# Patient Record
Sex: FEMALE | Race: ASIAN | NOT HISPANIC OR LATINO
[De-identification: names, ages, dates, MRNs, and addresses within clinical notes are randomized per-mention and may not be internally consistent; named-entity substitution may affect disease eponyms.]

---

## 2019-07-25 ENCOUNTER — RECORDS - HEALTHEAST (OUTPATIENT)
Dept: ADMINISTRATIVE | Facility: OTHER | Age: 19
End: 2019-07-25

## 2019-07-25 LAB
CHLAMYDIA_EXT- HISTORICAL: NEGATIVE
SPECIMEN DESCRIPTION_EXT (HISTORICAL CONVERSION): NORMAL

## 2019-08-01 ENCOUNTER — RECORDS - HEALTHEAST (OUTPATIENT)
Dept: ADMINISTRATIVE | Facility: OTHER | Age: 19
End: 2019-08-01

## 2019-09-05 ENCOUNTER — RECORDS - HEALTHEAST (OUTPATIENT)
Dept: ADMINISTRATIVE | Facility: OTHER | Age: 19
End: 2019-09-05

## 2019-10-03 ENCOUNTER — RECORDS - HEALTHEAST (OUTPATIENT)
Dept: ADMINISTRATIVE | Facility: OTHER | Age: 19
End: 2019-10-03

## 2019-10-03 ENCOUNTER — COMMUNICATION - HEALTHEAST (OUTPATIENT)
Dept: ADMINISTRATIVE | Facility: CLINIC | Age: 19
End: 2019-10-03

## 2019-10-25 ENCOUNTER — PRENATAL OFFICE VISIT - HEALTHEAST (OUTPATIENT)
Dept: MIDWIFE SERVICES | Facility: CLINIC | Age: 19
End: 2019-10-25

## 2019-10-25 DIAGNOSIS — B37.31 VAGINAL YEAST INFECTION: ICD-10-CM

## 2019-10-25 DIAGNOSIS — O23.599 BACTERIAL VAGINOSIS IN PREGNANCY: ICD-10-CM

## 2019-10-25 DIAGNOSIS — B96.89 BACTERIAL VAGINOSIS IN PREGNANCY: ICD-10-CM

## 2019-10-25 DIAGNOSIS — O13.3 GESTATIONAL HYPERTENSION, THIRD TRIMESTER: ICD-10-CM

## 2019-10-25 DIAGNOSIS — O26.843 UTERINE SIZE-DATE DISCREPANCY IN THIRD TRIMESTER: ICD-10-CM

## 2019-10-25 DIAGNOSIS — Z34.03 SUPERVISION OF NORMAL FIRST TEEN PREGNANCY IN THIRD TRIMESTER: ICD-10-CM

## 2019-10-25 DIAGNOSIS — O09.31: ICD-10-CM

## 2019-10-25 LAB
ALT SERPL W P-5'-P-CCNC: 11 U/L (ref 0–45)
APTT PPP: 26 SECONDS (ref 24–37)
AST SERPL W P-5'-P-CCNC: 18 U/L (ref 0–40)
CREAT SERPL-MCNC: 0.68 MG/DL (ref 0.6–1.1)
CREAT UR-MCNC: 115.6 MG/DL
ERYTHROCYTE [DISTWIDTH] IN BLOOD BY AUTOMATED COUNT: 14.1 % (ref 11–14.5)
GFR SERPL CREATININE-BSD FRML MDRD: >60 ML/MIN/1.73M2
HCT VFR BLD AUTO: 38.4 % (ref 35–47)
HGB BLD-MCNC: 12.9 G/DL (ref 12–16)
INR PPP: 0.9 (ref 0.9–1.1)
MCH RBC QN AUTO: 28.7 PG (ref 27–34)
MCHC RBC AUTO-ENTMCNC: 33.5 G/DL (ref 32–36)
MCV RBC AUTO: 85 FL (ref 80–100)
PLATELET # BLD AUTO: 200 THOU/UL (ref 140–440)
PMV BLD AUTO: 7.8 FL (ref 7–10)
PROTEIN, RANDOM URINE - HISTORICAL: 818 MG/DL
PROTEIN/CREAT RATIO, RANDOM UR: 7.08
RBC # BLD AUTO: 4.49 MILL/UL (ref 3.8–5.4)
URATE SERPL-MCNC: 7.6 MG/DL (ref 2–7.5)
WBC: 8.8 THOU/UL (ref 4–11)

## 2019-10-25 ASSESSMENT — MIFFLIN-ST. JEOR: SCORE: 1358.39

## 2019-10-25 ASSESSMENT — EDINBURGH POSTNATAL DEPRESSION SCALE (EPDS): TOTAL SCORE: 3

## 2019-10-26 ENCOUNTER — COMMUNICATION - HEALTHEAST (OUTPATIENT)
Dept: OBGYN | Facility: CLINIC | Age: 19
End: 2019-10-26

## 2019-10-26 ENCOUNTER — AMBULATORY - HEALTHEAST (OUTPATIENT)
Dept: OBGYN | Facility: CLINIC | Age: 19
End: 2019-10-26

## 2019-10-26 ENCOUNTER — HOSPITAL ENCOUNTER (OUTPATIENT)
Dept: MEDSURG UNIT | Facility: CLINIC | Age: 19
Discharge: HOME | End: 2019-10-26
Attending: ADVANCED PRACTICE MIDWIFE | Admitting: ADVANCED PRACTICE MIDWIFE

## 2019-10-26 DIAGNOSIS — B96.89 BACTERIAL VAGINOSIS IN PREGNANCY: ICD-10-CM

## 2019-10-26 DIAGNOSIS — O14.93 PRE-ECLAMPSIA IN THIRD TRIMESTER: ICD-10-CM

## 2019-10-26 DIAGNOSIS — B37.31 VAGINAL YEAST INFECTION: ICD-10-CM

## 2019-10-26 DIAGNOSIS — O13.3 GESTATIONAL HYPERTENSION, THIRD TRIMESTER: ICD-10-CM

## 2019-10-26 DIAGNOSIS — O09.31: ICD-10-CM

## 2019-10-26 DIAGNOSIS — O23.599 BACTERIAL VAGINOSIS IN PREGNANCY: ICD-10-CM

## 2019-10-26 LAB
CREAT UR-MCNC: 109.6 MG/DL
PROTEIN, RANDOM URINE - HISTORICAL: 684 MG/DL
PROTEIN/CREAT RATIO, RANDOM UR: 6.24

## 2019-10-27 ENCOUNTER — HOSPITAL ENCOUNTER (OUTPATIENT)
Dept: MEDSURG UNIT | Facility: CLINIC | Age: 19
Discharge: HOME | End: 2019-10-27
Attending: MIDWIFE | Admitting: ADVANCED PRACTICE MIDWIFE

## 2019-10-27 LAB
ALLERGIC TO PENICILLIN: NO
GP B STREP DNA SPEC QL NAA+PROBE: NEGATIVE

## 2019-10-27 ASSESSMENT — MIFFLIN-ST. JEOR: SCORE: 1362.36

## 2019-10-28 ENCOUNTER — PRENATAL OFFICE VISIT - HEALTHEAST (OUTPATIENT)
Dept: MIDWIFE SERVICES | Facility: CLINIC | Age: 19
End: 2019-10-28

## 2019-10-28 DIAGNOSIS — O14.93 PRE-ECLAMPSIA IN THIRD TRIMESTER: ICD-10-CM

## 2019-10-28 LAB
ALT SERPL W P-5'-P-CCNC: 27 U/L (ref 0–45)
APTT PPP: 24 SECONDS (ref 24–37)
AST SERPL W P-5'-P-CCNC: 30 U/L (ref 0–40)
CREAT SERPL-MCNC: 0.71 MG/DL (ref 0.6–1.1)
ERYTHROCYTE [DISTWIDTH] IN BLOOD BY AUTOMATED COUNT: 14.5 % (ref 11–14.5)
GFR SERPL CREATININE-BSD FRML MDRD: >60 ML/MIN/1.73M2
HCT VFR BLD AUTO: 38.9 % (ref 35–47)
HGB BLD-MCNC: 13.1 G/DL (ref 12–16)
INR PPP: 0.91 (ref 0.9–1.1)
MCH RBC QN AUTO: 29.1 PG (ref 27–34)
MCHC RBC AUTO-ENTMCNC: 33.8 G/DL (ref 32–36)
MCV RBC AUTO: 86 FL (ref 80–100)
PLATELET # BLD AUTO: 278 THOU/UL (ref 140–440)
PMV BLD AUTO: 7.8 FL (ref 7–10)
RBC # BLD AUTO: 4.52 MILL/UL (ref 3.8–5.4)
URATE SERPL-MCNC: 7.8 MG/DL (ref 2–7.5)
WBC: 15.4 THOU/UL (ref 4–11)

## 2019-10-29 ENCOUNTER — HOSPITAL ENCOUNTER (OUTPATIENT)
Dept: ULTRASOUND IMAGING | Facility: CLINIC | Age: 19
Discharge: HOME | End: 2019-10-29
Attending: ADVANCED PRACTICE MIDWIFE

## 2019-10-29 DIAGNOSIS — O14.93 PRE-ECLAMPSIA IN THIRD TRIMESTER: ICD-10-CM

## 2019-10-30 ENCOUNTER — COMMUNICATION - HEALTHEAST (OUTPATIENT)
Dept: ADMINISTRATIVE | Facility: CLINIC | Age: 19
End: 2019-10-30

## 2019-10-30 ENCOUNTER — AMBULATORY - HEALTHEAST (OUTPATIENT)
Dept: MIDWIFE SERVICES | Facility: CLINIC | Age: 19
End: 2019-10-30

## 2019-10-30 ENCOUNTER — COMMUNICATION - HEALTHEAST (OUTPATIENT)
Dept: OBGYN | Facility: CLINIC | Age: 19
End: 2019-10-30

## 2019-10-30 DIAGNOSIS — O09.31: ICD-10-CM

## 2019-10-30 DIAGNOSIS — B37.31 VAGINAL YEAST INFECTION: ICD-10-CM

## 2019-10-30 DIAGNOSIS — O23.599 BACTERIAL VAGINOSIS IN PREGNANCY: ICD-10-CM

## 2019-10-30 DIAGNOSIS — B96.89 BACTERIAL VAGINOSIS IN PREGNANCY: ICD-10-CM

## 2019-10-30 DIAGNOSIS — O13.3 GESTATIONAL HYPERTENSION, THIRD TRIMESTER: ICD-10-CM

## 2019-11-02 ENCOUNTER — HOME CARE/HOSPICE - HEALTHEAST (OUTPATIENT)
Dept: HOME HEALTH SERVICES | Facility: HOME HEALTH | Age: 19
End: 2019-11-02

## 2019-11-05 ENCOUNTER — HOME CARE/HOSPICE - HEALTHEAST (OUTPATIENT)
Dept: HOME HEALTH SERVICES | Facility: HOME HEALTH | Age: 19
End: 2019-11-05

## 2019-11-05 ENCOUNTER — COMMUNICATION - HEALTHEAST (OUTPATIENT)
Dept: HOME HEALTH SERVICES | Facility: HOME HEALTH | Age: 19
End: 2019-11-05

## 2019-11-11 ENCOUNTER — COMMUNICATION - HEALTHEAST (OUTPATIENT)
Dept: MIDWIFE SERVICES | Facility: CLINIC | Age: 19
End: 2019-11-11

## 2019-11-11 ENCOUNTER — RECORDS - HEALTHEAST (OUTPATIENT)
Dept: HEALTH INFORMATION MANAGEMENT | Facility: CLINIC | Age: 19
End: 2019-11-11

## 2019-12-19 ENCOUNTER — OFFICE VISIT - HEALTHEAST (OUTPATIENT)
Dept: MIDWIFE SERVICES | Facility: CLINIC | Age: 19
End: 2019-12-19

## 2019-12-19 DIAGNOSIS — Z87.59 HISTORY OF IUFD: ICD-10-CM

## 2019-12-19 DIAGNOSIS — B37.31 VAGINAL YEAST INFECTION: ICD-10-CM

## 2019-12-19 DIAGNOSIS — N89.8 VAGINAL DISCHARGE: ICD-10-CM

## 2019-12-19 DIAGNOSIS — Z87.59 HISTORY OF PRIOR PREGNANCY WITH IUGR NEWBORN: ICD-10-CM

## 2019-12-19 DIAGNOSIS — Z87.59 HISTORY OF SEVERE PRE-ECLAMPSIA: ICD-10-CM

## 2019-12-19 DIAGNOSIS — B96.89 BV (BACTERIAL VAGINOSIS): ICD-10-CM

## 2019-12-19 DIAGNOSIS — R21 SKIN RASH: ICD-10-CM

## 2019-12-19 DIAGNOSIS — N76.0 BV (BACTERIAL VAGINOSIS): ICD-10-CM

## 2019-12-19 DIAGNOSIS — Z87.59 HISTORY OF PLACENTAL ABRUPTION: ICD-10-CM

## 2019-12-19 LAB
CLUE CELLS: ABNORMAL
TRICHOMONAS, WET PREP: ABNORMAL
YEAST, WET PREP: ABNORMAL

## 2019-12-19 ASSESSMENT — MIFFLIN-ST. JEOR: SCORE: 1266.42

## 2019-12-19 ASSESSMENT — EDINBURGH POSTNATAL DEPRESSION SCALE (EPDS): TOTAL SCORE: 7

## 2020-03-30 ENCOUNTER — COMMUNICATION - HEALTHEAST (OUTPATIENT)
Dept: FAMILY MEDICINE | Facility: CLINIC | Age: 20
End: 2020-03-30

## 2020-04-02 ENCOUNTER — AMBULATORY - HEALTHEAST (OUTPATIENT)
Dept: MIDWIFE SERVICES | Facility: CLINIC | Age: 20
End: 2020-04-02

## 2020-04-02 DIAGNOSIS — Z00.00 ROUTINE ADULT HEALTH MAINTENANCE: ICD-10-CM

## 2020-04-07 ENCOUNTER — COMMUNICATION - HEALTHEAST (OUTPATIENT)
Dept: FAMILY MEDICINE | Facility: CLINIC | Age: 20
End: 2020-04-07

## 2020-04-07 ENCOUNTER — COMMUNICATION - HEALTHEAST (OUTPATIENT)
Dept: PEDIATRICS | Facility: CLINIC | Age: 20
End: 2020-04-07

## 2020-04-07 DIAGNOSIS — Z11.1 SCREENING EXAMINATION FOR PULMONARY TUBERCULOSIS: ICD-10-CM

## 2020-04-08 ENCOUNTER — AMBULATORY - HEALTHEAST (OUTPATIENT)
Dept: OBGYN | Facility: CLINIC | Age: 20
End: 2020-04-08

## 2020-04-08 DIAGNOSIS — Z00.00 HEALTHCARE MAINTENANCE: ICD-10-CM

## 2021-06-02 NOTE — PROGRESS NOTES
Outpatient/Triage Note:    Patient Name:  Florentino Brantley  :      2000  MRN:      [id number]    Assessment:   @ 34w5d   Preeclampsia based on BP readings and proteinuria  S<D  Teen pregnancy    Plan:   -NST  -Follow up growth with BPP now  -P/C ratio now  -Serial BPs  -GBS  -Consult with [facility] after results are back      Subjective  Florentino Brantley is a 18 y.o.  who presented to [facility] for evaluation of newly elevated P/C ratio with previous dx of GHTN at 34 5/7 weeks.  She is late to prenatal care, beginning at 21 5/7 weeks with [facility] (19).  She states she did have a free ultrasound at a location in [facility] in July that gave JOSETTE of 19.  Approximate LMP of 3/16/19 gave JOSETTE of 19.  FAS on 19 gave JOSETTE of 19, which is inconsistent with LMP dating but consistent with pt reported US in July.      US results from 19:  Normal FAS, EFW 14%ile, anterior placenta, cvx 4.5    She had regular care and screening after her initial visit until 10/3/19.  At her prenatal visit on 10/3/19 at [facility], she was noted to have elevated BP at 134/93.  She was also noted to be S<D and follow up growth US was ordered.  She did not complete this, due to desire to change to [facility] for preferred delivery at .  Labs were completed on 10/3/19 and were WNL with exception of uric acid at 6.8.    She had her initial OB appt with [facility] 10/25/19 and was noted to have elevated BP again at 142/92.  She was given the diagnosis of GHTN and labs were completed.  They were normal with exception of uric acid at 7.6 and P/C ratio of 7.08.  This lab was noted late last evening (150).  Pt was called this AM to come to [facility] for evaluation of maternal/fetal status and OB consult.      Florentino and I spoke about the range of hypertensive disorders in pregnancy and how they can affect mother and fetus.  Based on her elevated BP with 2 readings > 4 hours apart, normal liver enzymes, Hgb and Plt, but  "now proteinuria, her diagnosis would be preeclampsia without severe features.  We discussed physiology of hypertension and issues with fetal growth and worsening progression as pregnancy continues in some cases.  Our objective is to continue pregnancy until 37 0/7 weeks as long as maternal and fetal status remain stable.  We discussed follow up growth US, repeat P/C ratio, and OB consult today.  We discussed likely/possible recommendation of the following:    GBS  24 hour urine  Betamethasone initiation  Twice weekly office visits, BPPs with doppler, and labs  Delivery at 37 0/7 weeks or sooner with worsening disease    Objective  Temp:  [98.4  F (36.9  C)] 98.4  F (36.9  C)  Heart Rate:  [96] 96  Resp:  [16] 16  BP: (119)/(81) 119/81    Physical Exam:  General appearance:  comfortable  Psych: AAO x3  Skin: Pink, warm & dry  HEENT: unremarkable  Cardiovascular:  Current BP and pulse WNL  Respiratory:  Breathing easily on RA  Abdomen: soft, NT, gravid    FHR:Baseline: 150 bpm, Variability: Moderate (6 - 25 bpm), Accelerations: \"present and Decelerations: Absent    Uterine contractions:TocoFrequency: irregular, Duration: 30-60 seconds and Intensity: mild.  Pt not feeling them.    After hydration uterine activity is rare    SVE: not examined    Extremeties: without significant edema    Total time spent with patient:15 minutes, >50% spent on counseling and coordination of care.    Provider:  Katherine Raygoza CNM, RITO,SIVAN    Date:  10/26/2019  Time:  1:07 PM              "

## 2021-06-02 NOTE — PROGRESS NOTES
Pt of CNM presents to [facility] for second betamethasone shot due to elevated blood pressures at 34 6/7 weeks gestation.  Pt is G2/0010, EDC 12/2/19.  EFM on.  Hx and info obtained.  Pt denies HA, epigastric pain, swelling, vision changes.  Reflexes +2 bilat, no clonus bilat.

## 2021-06-02 NOTE — TELEPHONE ENCOUNTER
----- Message -----  From: Aliyah Lees APRN, CNM  Sent: 10/25/2019  10:52 PM CDT  To: RITO West CNM, #    Transfer of care visit today with elevated blood pressure.  Urine PC ratio and uric acid elevated, results reviewed now.  Urine PC ratio up from 10/3/2019 measuring 0.24.  Plan to [place] [facility] [place] in the morning (10/26/2019) including consultation with in-house obstetrician (simply considering time of day, 10:49 PM).

## 2021-06-02 NOTE — PROGRESS NOTES
Shelbi Robles CNM updated on betamethasone shot, BP's, no s/s preeclampsia, FHT's.  Orders received to D/C pt.  Pt to follow up in clinic later this week.  D/C instructions discussed with pt.  Pt understands and is agreeable with plan.  D/C to home undelivered.

## 2021-06-02 NOTE — PROGRESS NOTES
"Subjective:  Florentino presents to clinic alone at 35w0d for a check for preeclampsia. She reports she has been feeling well. Highly aware of preeclampsia symptoms; denies headache / vision changes / RUQ pain. Baby is active. Denies bleeding / LOF / contractions. BV and yeast symptoms resolved. Feeling \"a little worried\" about IOL, wondering if the baby needs more time to grow, wondering if the preeclampsia could \"get better.\" Reviewed recommendation for IOL at 37 week for best maternal & fetal outomes, no chance the preeclampsia will \"resolve\" and she will be pregnant longer than this. Reassurance provided. Reviewed GBS negative. Reports good support from her fiance (Pérez), mother, and mother-in-law. Baby shower this weekend-- will purchase carseat after if not given as a gift. Hoping for unmedicated labor, but open to epidural if needed. Undecided on peds provider-- will work on that this week. Working on preregistration tonight.    Objective:  BP (!) 142/96 (Patient Site: Right Arm, Patient Position: Sitting, Cuff Size: Adult Regular)   Pulse 81   Wt 152 lb (68.9 kg)   LMP 03/16/2019 (Approximate)   SpO2 98%   BMI 29.69 kg/m      Blood pressure recheck: 146/88    General: Alert, well-appearing 18-yo female  Extremities: 1+ edema, 2+ DTRs, negative for clonus    Assessment:  -Preeclampsia w/o severe features  -Elevated blood pressure, non-severe range  -Reactive NST today  -IUGR  -High-risk pregnancy in third trimester  -GBS negative    Plan:  -Repeat labs today-- will not reorder protein / creatine ratio as changes in proteinuria will not influence management decisions. Florentino is aware she will receive a phone call if labs indicate she needs to return to the hospital tonight for further workup or treatment.  -BPP tomorrow  -Continue twice weekly visits with CNMs, BPPs, and lab recheck  -IOL at 37w, sooner if severe features develop    "

## 2021-06-02 NOTE — TELEPHONE ENCOUNTER
Telephone Call with on Call SIVAN Good returned the voicemail that was left for her this am.  Reviewed elevated PCR and what this result may indicate.  Recommend that she present to [facility] for evaluation which is likely to include BP, ultrasound, labs.  An OB consult will also occur to discuss current status and the plan moving forward. She verbalized understanding.    Unfortunately, her hospital of choice, [facility] is on divert and cannot accept more patients.  However, [facility] is open and RITO López CNM is on site.  She agrees to present to [facility] between 12:00 and 1:00.  Katherine Raygoza is aware.    RITO West CNM

## 2021-06-02 NOTE — PATIENT INSTRUCTIONS - HE
[facility] Nurse Midwives - Contact information:  Appointment line and to get a hold of CNM in clinic Monday-Friday 8 am - 5 pm:  [phone].  There are some clinics with early start times (1st appointment 7:40 am) and others with evening hours (last appointment 6:20 pm).  Most are typically open from 8 am to 5 pm.    CNM on call answering service: [phone].  Specify your hospital of choice and leave a brief message for CNM;  will then page CNM who is on call at your specified hospital and you should receive a call back with 15 minutes.  Be sure that your ringer is audible and that you can accept blocked calls so that we can get back in touch with you! This number should be reserved for urgent needs if during the day, before 8 am, after 5 pm, weekends, holidays.    Contact the on-call CNM with warning signs, such as:    vaginal bleeding     Vaginal discharge and itching or pain and burning during urination    Leg/calf pain or swelling on one side    severe abdominal pain    nausea and vomiting more than 4-5 times a day, or if you are unable to keep anything down    fever more than 100.4 degrees F.          You are invited to  Meet the [facility] Nurse-Midwives  A way to tour the hospital Labor and Delivery unit and meet the midwives that attend births since you may not have the opportunity to meet them during your prenatal care.  Some sessions are informal meet and greet type social hours, others address a specific concern or topic.    Tuesday, Februrary 12, 2019 7-8pm  [facility]    Wednesday, April 17, 2019 7-8pm  [facility], [facility]    Thursday, August 15, 2019 7-8pm  [facility]    Wednesday November 13, 2019 7-8 pm  [facility], [facility]    Please call [phone] to register        Touring the Maternity Care Center  To schedule a tour at either [place] or [facility], please do so online using  the following links:  [facility] - [email or web address]  [facility] - [email or web address]      Car Seat Clinics:  https://dps.[place].gov/divisions/ots/child-passenger-safety/Pages/car-seat-checks.aspx  [facility]    Free Car Seat Distribution Facilities     By Appt. Address Contact Information (For appointment)      \Yes [employer]\[address]\[place],\cell Stephanie Garcia)-\[email or web address]      Yes [facility]\ [facility]\[place],\[address])[email or web address]      Yes [facility]\[address] [facility]\[place],\cell Mikayla Elias)[email or web address]      Immunizations:  http://www.cdc.gov/vaccines/schedules/easy-to-read/child.html      Postpartum Depression  The first weeks of caring for a  baby are more than a full-time job. Although it is often a happy time, your feelings and moods may not be what you expected. Many women experience  baby blues.  Here are some tips to help you understand when feelings of sadness are normal, and when you should call your health care provider.    What are the baby blues?  As many as 3 of every 4 women will have short periods of mood swings, crying, or feeling cranky or restless during the first weeks after birth. These feelings can be worse when you are tired or anxious. Women who have the baby blues often say they feel like crying but don t know why. Baby blues usually happen in the first or second week postpartum (after you give birth) and last less than a week. If you are not sleeping, becoming more upset, don t feel like you can take care of your baby, or your sadness lasts 2 weeks or more, call your health care provider.    What is postpartum  depression?  About one in every 5 women will develop depression during the first few months postpartum that may be mild, moderate, or severe. Women who have postpartum depression may have some of these symptoms:    Feeling guilty     Not able to enjoy your baby and feeling like you are not bonding with your baby      Not able to sleep, even when the baby is sleeping    Sleeping too much and feeling too tired to get out of bed    Feeling overwhelmed and not able to do what you need to during the day    Not able to concentrate    Don t feel like eating    Feeling like you are not normal or not yourself anymore    Not able to make decisions    Feeling like a failure as a mother    Feeling lonely or all alone    Thinking your baby might be better off without you  If you have any of these symptoms, call your health care provider!    Which symptoms of postpartum depression are dangerous?  Sometimes a woman with postpartum depression will have thoughts of harming herself or her baby. If you find yourself thinking about hurting yourself or your baby, call your health care provider immediately.    MOTHERHOOD: THE EARLY DAYS  You prepare for the birth of your baby for many months during pregnancy, and then the first months at home after your baby is born can be a quiet, gentle time of getting to know this new person who has come to live in your home. But for most women it is not all quiet or sweet. And for some women it is a very hard time.  What Can I Expect in the First Few Months After the Baby Comes?  New mothers and their families face many challenges in the first few months:    Your body and your hormones have to get back to normal.    You and the baby will be learning to breastfeed.    Babies only sleep a few hours at a time. The entire family will have a hard time getting enough sleep.    You and your family need to learn how to parent this new family member.    If you have a partner, you have to figure out how to  stay together as a couple and maybe even start to have sex again.    You may have to figure out how to keep from getting pregnant again right away.    You may need to return to work and find day care.    How Long Will it Take for My Body to Get Back to Normal?  Some changes will occur quickly. Others will not occur as quickly.    Your uterus, cervix, and vagina will all shrink to their nonpregnant size in about 2 weeks. Your vagina may be tender and dry for a few months--especially if you are breastfeeding.    If you had stitches or hemorrhoids, your   bottom   will be sore for 2 weeks or more.    For some women who have problems urinating, it can take several months for you to be able to hold your urine when you cough or sneeze or suddenly  something heavy.    Your breast milk will   come in   2 to 3 days after the birth of your baby. It will take 6 to 8 weeks for you and the baby to get the hang of breastfeeding and find a pattern. During these first weeks, you can have engorged breasts at times and often leak milk.    Your stomach and intestines all have to fall back into place. You may have a lot of gas for a few weeks.    You may be constipated--especially if you are breastfeeding.    Your stretched stomach muscles can recover in a few weeks, but for some women it takes longer--6 months or a year--to recover.    If you had a  delivery, you may have pain or numbness around the incision for 6 months or more.    Losing the weight you gained during pregnancy will probably take 6 months to a year. Have patience! It took 40 weeks to get here. Give yourself 40 weeks to get back.    What Can I Expect When My Hormones Change?  About 75% of all women will get the   blues.   This usually starts about 3 days after the birth of your baby. You may cry easily and feel very, very tired. A few women become very depressed. If you had a  delivery or your new baby was sick, you are at a higher risk for  depression.  Call your health care provider right away if you cannot care for yourself or your baby, if you feel very nervous or worried, if you cannot stop crying, or if you are having thoughts of hurting yourself or your baby.    Taking Care of Yourself  While you are still pregnant:    Talk with your partner and your family about the time ahead. Arrange for someone to help you during the first weeks at home if you can.    Talk with your health care provider about birth control options and make a plan before the baby comes.    If you are worried about how to parent a , take parenting classes. You will learn a lot about how babies act and you will make some friends who are going through the same thing at the same time. Most [facility] have these classes.    Arrange for someone to help with baby care if you can.  After the baby comes:    Ask for help. Let other people do the cooking and cleaning and run the house. Focus on yourself and your baby.    Sleep whenever you can. Try not to be tempted to   get some things done   when the baby sleeps. This is your time to sleep, too.    Drink lots of water. You will need at least 6 big glasses of water everyday to avoid constipation and make enough breast milk. Every time you sit down to breastfeed, have a big glass of water with you to drink while you are nursing.    Eat lots of vegetables and fruit. You will need lots of vitamins and fiber to help your body get back to normal. This will also help you avoid constipation.    Go outside and walk. Babies can go outside even if it is very cold. Fresh air and sunshine will do you both good.    Take sitz baths. Put about 6 inches of warm water in your bathtub and sit in there for 15 minutes 2 to 3 times a day. This will help your   bottom   heal more quickly. It will also give you 15 minutes of private time!    Talk to other mothers. Join a new parents group. Call Maximino and go to [facility] meetings if you are  breastfeeding.     With your partner:    Keep talking. Share the experience.    Spend time alone. Even a 30-minute walk can be a date.    Start a birth control method. You can get pregnant before you even have a period. It is very important to use birth control if you do not want to get pregnant again right away.    When you have sex, use a lubricant. A lot of lubricant! Take it slow.  The first few months after a baby comes can be a lot like floating in a jar of honey--very sweet and acosta, but very sticky, too. Take time to enjoy the good parts. Remind yourself that this time will pass. Bon voyage!    FOR MORE INFORMATION  For questions about depression during and after pregnancy:  http://www.womenshealth.gov/publications/our-publications/fact-sheet/depression-pregnancy.html   After birth: The first 6 weeks:  http://www.[employer]/Post-Birth-and-Recovery   Breastfeeding resources:  http://www.[employer].org/health-info/getting-breastfeeding-off-to-a-good-start/    HEALTHY PREGNANCY CARE: 37 to 41 WEEKS PREGNANT    Talk with your midwife or physician about when to call with signs of labor    Regular uterine contractions that are getting closer together and/or stronger    If you think your water has broken or is leaking    Bleeding from the vagina like a period (bloody vaginal discharge is normal)    If you are not feeling your baby move    Make plans for transportation and  as needed for when you are going to the hospital.    Your midwife or physician may offer to check your cervix for changes.     Ask your health care provider about vaccinations you may need following delivery. By now, you should have received a Tdap immunization to protect against pertussis or whooping cough. Fathers and family members who will be in close contact with the baby should also receive a Tdap shot at least two weeks before the expected birth of the baby if they have not had a Td (tetanus) shot for at least  two years.    If you are past your due date, discuss the next steps leading to delivery with your midwife or physician. If you don't start labor on your own by 41 or 42 weeks, your midwife or physician may recommend giving you medicines to ripen your cervix and start labor.    Preparing for your baby: Tell your midwife or physician how you plan to feed your baby (breast or bottle), who you have chosen to do pediatric care for your baby, and if you have a boy, whether you have chosen to have him circumcised. You will need a car seat correctly installed in your vehicle to bring your baby home. As you start to set up the nursery at home for your baby, make sure the crib is safe. The mattress needs to fit snugly against the edges of the crib. If you can fit a soda can between the bars, they are too far apart and can allow the baby's head to caught between them.    Learn about infant care and feeding, including information about infant CPR. We recommend that you put your baby to sleep on his or her back to reduce the chance of Sudden Infant Death Syndrome (SIDS). To maintain a healthy environment in which your child can grow, it's best to keep your home smoke-free. By preparing ahead, your transition into parenthood will go smoothly for you and your baby.    Your midwife or physician will want to see you for a checkup 2 to 6 weeks after delivery.    If you have questions about any symptoms you are experiencing or any other concerns, call your provider or their clinic staff at [facility] at Phone: [phone]. If it's after clinic hours, physician patients should call the [facility] at [phone] ([phone]); midwife patients should call their answering service at [phone].    How can you care for yourself at home?   You can refer to the Starting Out Right book or find it online at http://www.[facility].org/images/stories/maternity/[facility]-Starting-Out-Right.pdf or  http://www.[facility].org/images/stories/flipbooks/[facility]-starting-out-right/[facility]-starting-out-right.html#p=8     You can sign up for a weekly parenting e-mail that gives support, tips and advice from health care professionals that starts with pregnancy and continues through the toddler years. To register, go to [email or web address] at any time during your pregnancy.        Making Plans for Feeding My Baby    By this point, you probably have read a lot about feeding your baby.  Breastfeed or formula? Each mother s decision is her own and [facility] respects you and your choices. We ve gathered information on both breastfeeding and formula feeding to help with your decision. Talking with your physician or nurse-midwife can also help in your decision.  However you plan to feed your baby, [facility] encourage rooming in with your baby, skin-to-skin contact and feeding your baby based on his or her cues.    Skin-to-skin contact  Being close to mom helps your baby adjust to life outside of the womb.  It helps your baby regulate their body temperature, heart rate, and breathing.  Your baby will usually be placed skin-to-skin immediately following birth or as soon as possible, if medical intervention is needed.    Rooming-In  Having your baby stay with you in your room is called  rooming-in .  Keeping your baby in your room helps you to learn how to care for your baby by getting to know your baby s cues, body rhythms and sleep cycle.       Cue-based feeding  Cues (signals) are baby s way of telling you what he or she wants.  When you learn your infant s cues, you know how to care for and feed your baby.   Feeding cues are the licking and smacking of lips, bringing their fist to their mouth, and a reflex called  rooting - where baby turns and opens his or her mouth, searching for the breast or bottle.  Crying is a late feeding cue.  Babies can feed frequently, often at least 8 times in 24  hours.  Breastfeeding facts  Breast milk is the best source of nutrition for your baby and is available at birth. In the first couple of days, your milk volume is already starting to increase, though it may not be noticeable. Breastfeed frequently to increase your milk supply. Within three to five days, you will begin to notice larger milk volumes. An increase in breast size, heaviness and firmness are often described as the milk  coming in.  Frequent breastfeeding can help breasts from getting overly firm and painful. You will know the baby is getting enough milk if your baby is having wet and dirty diapers and gaining weight.     If your goal is to exclusively breastfeed, it is important to not use any formula or artificial nipples (including bottles and pacifiers) while your baby is learning to breastfeed.  While it may seem like an  easy  option to give your baby a bottle, formula should only be given if there is a medical reason for your baby to have it.    Positioning and attachment   Get comfortable.  Use pillows as needed to support your arms and baby.  Hold baby close at the level of your breast, facing you in a tummy to tummy position.  Skin to skin helps with this.  Position the baby with his or her nose by the nipple.  There should be a straight line from baby s ear to shoulder to hips.  Tickle your baby s lips or wait for baby to open mouth wide, bring baby to breast by leading with the chin.  Aim the nipple at the roof of baby s mouth.  A rapid sucking pattern is followed by longer, drawing pattern with occasional swallows heard.  When baby is correctly latched, your nipple and much of the areola are pulled well into baby s mouth.      Returning to work or school  Focus on a good start to breastfeeding.  Many women continue to provide breastmilk for their baby when they return to work or school.  Making plans about where to pump and store milk can make the transition go well.  Talk with other mothers  who have also returned to work or school for tips and support.  Your employer s Human Resource department may be a resource as well.     Returning to work or school: (continued)     babies can mean fewer  sick  days for you.    A quality breast pump will also save time and add comfort.  Check with your insurance prior to giving birth for breast pump coverage.  Many insurance companies include a pump within your benefits.    Wait until your baby is at least three weeks old to introduce a bottle for the first time.  Have someone besides you give the bottle.    Breastfeed when you are with your baby. Reserve your bottles of breast milk for when you are away.     Your breasts will need to be  emptied  either by your baby or a pump.  Plan to pump at least twice in an eight hour day.    If you cannot pump at work, continue breastfeeding at home. Any amount of breast milk is worth giving to your baby.    Formula feeding facts  If you are planning to use formula to feed your baby, you will want to make some preparations ahead of time. Talk to your doctor or nurse-midwife about what type of formula to use. Some are iron-fortified, meaning they have extra iron in them. You will want to purchase formula and bottles before your baby is born to be sure you are ready after you return from the hospital. The [facility] do not provide formula samples to take home.    Be sure to follow formula mixing directions closely. Regular milk in the dairy case at the grocery store should not be given to babies under 1 year old. Baby formula is sold in several forms including:    Ready-to-use. This is the most expensive, but no mixing is necessary.    Concentrated liquid. This is less expensive than ready-to-use and you mix with water.    Powder. This is the least expensive. You mix one level scoop of powdered formula with two ounces of water and stir well.    Most babies need 2.5 ounces of formula per pound of body weight  each day. This means an 8-pound baby may drink about 20 ounces of formula a day; however, this is just an estimate. The most important thing is to pay attention to your baby s cues.  If your baby is always fussy, needs more iron or has certain food allergies, your physician may suggest you change your baby s formula to a different kind.     How do I warm my baby s bottles?  You may feed your baby a bottle without warming it first. It is OK for the breast milk or formula to be cool or room temperature. If your baby seems to prefer it warmed, you can put the filled bottle in a container of warm water and let it stand for a few minutes. Check the temperature of the liquid on your skin before feeding it to your baby; to be sure it isn t too hot. Do not heat bottles in the microwave. Microwaves heat food and liquids unevenly, and this can cause hot spots that can burn your baby.    How do I clean and sterilize bottles?  Sterilize bottles and nipples before you use them for the first time. You can do this by putting them in boiling water for 5 minutes. After that first time, you can wash them in hot and soapy water. Rinse them carefully to be sure there is no soap left on them. You can also wash them in the .    [facility] [phone] ([phone])    Share with Women\Sandy I in Labor?  What is labor? Labor is the work that your body does to birth your baby. Your uterus (the womb) contracts(tightens). The contractions(labor pains) push your baby down onto your cervix(the opening ofyour uterus). Thispressure causesyour cervix to open. When your cervix iscompletely open (10 centimeters dilated), you will push your baby through your vagina and out into the world.  What do contractions feel like? When contractionsfirst start, theyusually feellike cramps duringyour period. Sometimesyoufeelpain in your back. Mostoften,contractions feel like muscles pulling painfully in your lower belly. At first, the contractions will  probably be 15 to 20 minutes apart.They maybe irregular and will not feel too painful. As labor goes on, the contractionsget stronger,closer together, more consistent, and more painful.  How do I time the contractions? When the contractionsseem to be coming regularly, youshould start to time them.You time your contractions by counting the number of minutes from the start of one contraction to the start of the next contraction.  What should I do during early labor when the contractions start? If it is night andyoucan sleep, do so. If it happensduring the day, there are some things you can do to take care of yourself at home: Walk. If the painsyou are having are reallabor, walking will makethecontractionscome closer together and they will be stronger,but you will be able to cope with them better if you are standing or moving around. If the contractions are early labor ones that come andgo (sometimes called false labor), walkingcan make them go away. Take a shower or bath. This will help you relax. Eat. Labor is a big event.Your body needs a lot of energy to be effective.Eat whatever you feel like eating. Drink water. Not drinking enough water can cause contractions to not be as effectiveas theyshould be.You need to be well hydrated (drinking enoughwater) to help your body work well during labor. Take a na p. If youfeel tired, lay down on your side and get all the rest you can. It helps to be rested when you go intoactive labor. Do something you enjoy. Spend time with family. Watch a movie. Distraction will help you relax. Get a massage. If your labor is in your back, a strong massage on your lower back may feel very good. Getting a foot massage or having a partner rub your feet can also be very relaxing. Don t panic. You can do this. Your body was made for this. You are strong!  When should I call my health care provider if I think I am in labor? Your contractions have been 5 minutes or less apart for at least an  hour. Your contractions are becoming so painful youcannot walk or talk during one. You think your amniotic sac (bag of muniz) breaks. You may have a big gush of amniotic fluid (water) or just fluid that runs down your legs when you walk or move or change position.  Are there other reasons to call my health care provider? If you are concerned about anything, don t hesitate to call your health care provider.You should definitely call your health care provider or go to the hospital if:  It is 3 weeks or more before your due date, and you are having contractions.  You have vaginal bleeding that is more than your period, soaks your underwear, or runs down your legs.  You have sudden severe pain that does not go away with rest.  Your baby has not movedfor several hours.  You are leaking greenish fluid.    For More Information: http://onlinelibrary.byrd.com/doi/10.1111/jmwh.44192/epdf     US Department of Health and Human Services: Signs oflabor,labor stages, and types of birth  http://womenshealth.gov/pregnancy/childbirth-beyond/labor-birth.html#a      Childbirth and Parenting Education:   [place] parenting center: http://Zhihu/   (459) 925-BABY  [place]: (education, yoga & wellness) www.Convozine  Enlightened Mama: www.enlightenedPhysioSonics.APTwater   Childbirth collective: (Parent topic nights)  www.childbirthcollective.org/  Hypnobabies:  www.hypnobabiestwincities.com/  Hypnobirthing:  Http://hypnobirthing.com/    Book Recommendations:   Gudelia Whiteclay's Birthing From Within--first few chapters include a new-age tone, you may prefer to skip it and keep going, because there is good stuff later.  This book recommendation covers emotional preparation, but does cover coping with pain, and use of both pharmacological and nonpharmacological methods.    Dr. Mcnulty' The Pregnancy Book and The Birth Book--the pregnancy book goes month-by month    Womanly Art of Breastfeeding by La Leche League International   Bestfeeding by  "Jeniffer Meeks--great pictures    Mothering Your Nursing Toddler, by Shonda Kaur.   Addresses dealing with so many of the challenging behaviors of a nursing toddler.  How Weaning Happens, by La Leche League.  Discusses weaning at all ages, from medically necessary weaning of an infant, all the way up to age 5 (or older), with why/why not, and strategies.  Very empowering book both for deciding to wean and deciding not to.    American College of Nurse-Midwives (ACNM) http://www.midwife.org/; look at the informational handouts at http://www.midwife.org/Share-With-Women     www.mymidwife.org    Mother to Baby (Medication and Herbal guidance in pregnancy): http://www.mothertobaby.org  Toll-Free Hotline: 946.133.5897  LactMed (Medication use while breastfeeding): http://toxnet.nlm.nih.gov/newtoxnet/lactmed.htm    Women's Health.gov:  http://www.womenshealth.gov/a-z-topics/index.html    American pregnancy association - http://americanpregnancy.org    Centering Pregnancy (group prenatal care option): http://centeringhealthcare.org    Information about doulas:  Childbirth collective: http://www.childbirthcollective.org/  Doulas of [place] (ALLIE):  www.allie.org  [facility]  project: http://twincitiesdoulaproject.com/     Early Childhood and Family Education (ECFE):  ECFE offers parents hands-on learning experiences that will nourish a lifetime of teachable moments.  http://ecfe.info/ecfe-home/    March of Dimes www.marchAMTT Digital Service GroupdiEndoStim.com     FDA - Nutrition  www.mypyramid.gov  Under \"For Consumers,\" click on \"pregnant and breastfeeding women.\"      Centers for Disease Control and Prevention (CDC) - Vaccines : http://www.cdc.gov/vaccines/       When researching information on the web, question the validity of websites.  The domains .gov, .edu and.org tend to be more reliable information.  If there are a lot of advertisements, be cautious of the information provided. Stay away from blogs and chat rooms please!   "

## 2021-06-02 NOTE — TELEPHONE ENCOUNTER
"Telephone call:    Page from answering service at 6:14PM \"yellow/green urine and cramping\". This writer called patient back at 6:20PM and left message to return call to answering service. Two more attempts to call Florentino with no answer. Phone call from [facility] that patient arrived at 18:40 with reports of bleeding. Patient immidiately assessed by [facility]. This writer was immediately en route to [facility].     RITO Bryant, CNM, IBCLC  [facility]        "

## 2021-06-02 NOTE — PROGRESS NOTES
A:  at 34 5/7 weeks  Preeclampsia without severe features  IUGR, EFW < 10%ile, AC/FL < 2%ile  Late prenatal care    P: Discharge to home   Twice weekly clinic visits, BPPs with dopplers, and lab work  Betamethasone 12mg today and again in 24 hours  GBS done  Deliver at 37 0/7 weeks  Watch for s/s severe features: headache, visual changes, RUQ pain, or other concerns  Fetal movement awareness    S: Consult with [facility], Dr Steven.  Gave pt background with lab and ultrasound results along with recommendations for plan of care.  Agrees with plan as defined above.  See her note.  Explained possible etiologies for IUGR: product of HTN vs consitutional growth pattern.  She states both she and her partner weighed < 7lb at birth.  Pt agrees to betamethasone, returning tomorrow for second dose, twice weekly clinic visits, twice weekly labs and BPPs with doppler.  Instructed on warning signs for severe features.  Fetal awareness discussed by RN.      O: BPs:    119/81  128/88  133/92  127/87  134/79  135/90    PCR today: 6.    EXAM: ULTRASOUND BIOPHYSICAL PROFILE  LOCATION: [facility]  DATE/TIME: 10/26/2019, 2:14 PM     INDICATION: .  COMPARISON: 2019.     FINDINGS:  Single living fetus, vertex  presentation.  Heart rate of 142  beats per minute.  SDP 5.2  cm.  Placenta location at the fundus .     2/2 fetal breathing  2/2 fetal movements  2/2 fetal tone  2/2 amniotic fluid      Total biophysical profile      BIOMETRY:  Biparietal Diameter: 8.2  cm, 33 weeks 1 day  Head Circumference: 30.5 cm, 33 weeks 5 days  Abdominal Circumference: 25.8 cm, 30 weeks 0 days  Femur Length: 5.8 cm, 30 weeks 2 days     Estimated Fetal Weight: 1612 g +/- 235 g  EFW Percentile: <10%     Fetal Heart Rate: 142 bpm  Cervical Length: 4.2 cm  Distance from Placenta to Cervix: Not measured      EDC by This US exam: 2019     Composite Age by This US: 31 weeks 6 days     IMPRESSION:   CONCLUSION:    1.  Single living  intrauterine gestation.  2.  Based on this  ultrasound, composite age of 31 weeks 6 days  with EDC 12/22/2019 .  3.  Abdominal circumference and femur length are less than 2nd percentile. Estimated fetal weight is less than 10th percentile.   4.  Normal 8/8 biophysical profile.

## 2021-06-02 NOTE — PROGRESS NOTES
Outpatient/Triage Note:    Patient Name:  Florentino Brantley  :      2000  MRN:      [id number]      Assessment:   @ 34w6d here for second dose of Betamethasone.     Plan:   - Discharge to home undelivered. Reviewed warning signs including decreased fetal movement, leaking of fluid, vaginal bleeding, or signs of labor. Reviewed how to contact on-call CNM. Follow-up in clinic with CNM as scheduled or sooner as needed. All questions answered. Agrees with plan.     Subjective:  Florentino Brantley is a 18 y.o.  at 34w6d weeks, with an EDC of 19 who presented to [facility] for injection with second dose of Betamethasone. Denies leaking of fluid, bleeding, or changes in fetal movement.     Objective:  Vital signs: /81   Pulse 83   Temp 97.8  F (36.6  C) (Oral)   Resp 16   Ht 5' (1.524 m)   Wt 149 lb (67.6 kg)   LMP 2019 (Approximate)   BMI 29.10 kg/m    FHR: 140, LTV 5-20 bpm, accels to 155-160, no decels noted  Uterine contractions: irreg, mild    Physical Exam:   Completed by RN, BP stable, see nursing notes    Temp:  [97.8  F (36.6  C)] 97.8  F (36.6  C)  Heart Rate:  [82-83] 83  Resp:  [16] 16  BP: (119-132)/(81-88) 119/81  No intake or output data in the 24 hours ending 10/27/19 1639    Results:  Reactive NST    Provider:Shelbi Robles, RITO,CNM    TT with patient 0mn care, managed remotely, FHR strip reactive per nursing assessment, strip reviewed after patient discharged.

## 2021-06-02 NOTE — TELEPHONE ENCOUNTER
Form filled out using Care Everywhere.  Talked with HP HERMELINDA and patient marked on form that she was going to  the records.

## 2021-06-02 NOTE — TELEPHONE ENCOUNTER
Previous clinic: [facility] KEISHA appointment date & location: [facility] 10/17  Weeks gestation at appointment: 34 WKS  How will records be sent to the clinic?: FAXED TO W  Phone number where you may be reached: [phone]

## 2021-06-02 NOTE — CONSULTS
Consultation -OB GYN  Florentino Brantley,  2000, MRN [id number]    Admitting Dx: PREGNANCY    PCP: Amy Barreto APRN, CNM, [phone]   Code status:  No Order       Extended Emergency Contact Information  Primary Emergency Contact: KELLEN BRANTLEY  Home Phone: [phone]  Relation: Father  Secondary Emergency Contact: DREW BRANTLEY  Home Phone: [phone]  Relation: Mother       Assessment and Plan   Gestational hypertension with superimposed preeclampsia  Received betamethasone today, NST reactive, BPP   Recommend follow up tomorrow for second betamethasone   Recommend delivery if severe features    IUGR- less than 10%-probably constitutional but could be due to gestational hypertension.              twice weekly BPPs     Late prenatal care      Chief Complaint <principal problem not specified>       HPI    This is a late entry .  I  had been requested by SIVAN Raygoza to evaluate Florentino Brantley for preecla[dariel.  She is a  year old female who presented to labor and delivery for evaluation  Secondary to elevated urine protein creatinine ratio obtained at clinic.  She denied any preeclampsia symptoms including headache, visual changes, RUQ pain. She states she feels well. She has had good fetal movement.      Medical History  Active Ambulatory (Non-Hospital) Problems    Diagnosis     Bacterial vaginosis in pregnancy     Vaginal yeast infection     Pre-eclampsia in third trimester     Poor fetal growth affecting management of mother in third trimester     Gestational hypertension, third trimester     Supervision of high-risk pregnancy with insufficient prenatal care in first trimester     Elevated BP without diagnosis of hypertension     Date of last menstrual period (LMP) unknown     Encounter for supervision of normal first pregnancy in second trimester     Former light tobacco smoker     Need for hepatitis B vaccination     Past Medical History:   Diagnosis Date     Hypertension      Supervision of  normal first teen pregnancy in third trimester 10/25/2019    Surgical History  She  has no past surgical history on file.   Social History  Reviewed, and she  reports that she quit smoking about 7 months ago. She smoked 0.00 packs per day. She has never used smokeless tobacco. She reports previous alcohol use. She reports that she does not use drugs.   Allergies  No Known Allergies Family History  Reviewed, and family history includes Diabetes in her paternal grandmother; No Medical Problems in her brother, brother, father, maternal grandmother, mother, paternal grandfather, and sister; Stroke in her paternal grandmother.   Psychosocial Needs  Social History     Patient does not qualify to have social determinant information on file (likely too young).   Social History Narrative     Not on file     Additional psychosocial needs reviewed per nursing assessment.     Prior to Admission Medications   Facility-Administered Medications Prior to Admission   Medication Dose Route Frequency Provider Last Rate Last Dose     betamethasone acetate-betamethasone sodium phosphate injection 12 mg (CELESTONE)  12 mg Intramuscular Once Katherine Raygoza APRN,SIVAN         Medications Prior to Admission   Medication Sig Dispense Refill Last Dose     PNV,calcium 72-iron-folic acid (PRENATAL PLUS, CALCIUM CARB,) 27 mg iron- 1 mg Tab Take 1 tablet by mouth.   Taking          Review of Systems:  Pertinent items are noted in HPI. Physical Exam:       /81   Pulse 83   Temp 97.8  F (36.6  C) (Oral)   Resp 16   Ht 5' (1.524 m)   Wt 149 lb (67.6 kg)   LMP 03/16/2019 (Approximate)   BMI 29.10 kg/m    General appearance: alert, appears stated age and cooperative  Lungs: clear to auscultation bilaterally  Heart: regular rate and rhythm, S1, S2 normal, no murmur, click, rub or gallop  Abdomen: soft, non-tender; bowel sounds normal; no masses,  no organomegaly  Extremities: no edema, normal DTRS bilaterally lower extremities, no  clonus       Pertinent Labs  Lab Results: personally reviewed.

## 2021-06-02 NOTE — PROGRESS NOTES
"PRENATAL VISIT   Desired transfer of care Visit    Transfer of care from:  [facility]  Number of visits = 3  Initial visit date 2019  Pre-preg wgt = 115lb  Initial and 28 wk hgb  11.1 & 11.5 respectively  Initial platelets = 270k  UC = no growth  GCT = 126  Reason for transfer = Wanting to give birth at [facility]  Plan = Labwork s/t diagnosis today of gestational hypertension.  Discussed may need transfer to OB/GYN if develops severe hypertension or pre-eclampsia.  Verbalized understanding.    ASSESSMENT/ Impression   Here for desired transfer of care at 34w5d  18 y.o.    Gestational Hypterension   Hx of yeast infection  Hx of bacterial vaginosis infection    PLAN:   1.  Discussed working JOSETTE based on anatomy ultrasound: 2019.  Had been told at [facility] EDB was 19, however this was based off a free \"[facility] pregnancy [facility] ultrasound.  Discussed anatomy ultrasound most likely most accurate dating assessment.  2. Oriented to [facility] care services and hospital options; Reviewed prenatal care schedule.  IOB packet given and reviewed with patient.  Given emergency and scheduling numbers.  3. Previous labs/immunizations reviewed:   Given Hep B and flu vaccines on 2019, and Tdap on 10/3/2019  Labs from 2019:  Hgb = 11.1g/dL, Plt = 270k, Hep B Ag-, Hep B Ab- (Non-exposed, Non-Immune), RPR NR, HIV-, CF-, SMA = 2 copies of SMN1 & SMN2 (carrier reduced but not eliminated), Blood Type = B+, Antibody-, Rubella Immune, VZoster Immune, GC/CT-, Urine Culture-, Drug screen -, BV+, yeast+    Labs from 2019 =   Hgb 11.6 g/dL, Plt 239k, TB-, yeast+, RPR NR, GCT WNL    Labs from 10/3/2019 =   ALT=12, AST=17, Uric acid=6.8, BUN=11, Hgb=11.5, Fht=322, urine P-C ratio=0.26,     4. Optimal nutrition, exercise, and weight gain discussed.  BMI today 29.34, 22.64 @IOB.  Pregnancy weight gain of 25-35 lbs (BMI 18.5-24.9) encouraged.  Reviewed weight gain chart to date.  Advised " low salt and nutritious diet, daily exercise/walks outside.  Discussed and reviewed maternal growth chart.  5.Teaching: Anticipatory guidance for common pregnancy questions and concerns reviewed. Danger s/sx for this trimester reviewed with patient. Discussed and reviewed marked pregnancy checklist items, as well as s/s of pre-eclampsia.  Verbalized understanding to call with any symptoms.  6. Genetic screening done at previous clinic as noted, not screened for trisomies/sex chromosomal disorders.  7. Return to clinic in 2 weeks, sooner as needed.    -Total time spent with patient: 60 minutes, >50% time spent counseling and coordinating care.   SUBJECTIVE:   Florentino Brantley is a 18 y.o.  here today for transfer of care from [facility]- wanting to give birth at [facility].  Accompanied by partner Pérez.  Asking appropriate questions, mature manner.    Risk factors: teenage pregnancy. Pregnancy Risk Factors: Currently unmarried, Hypertension or pre-eclampsia and Inadequate prenatal care (<2 visits in 2nd or 3rd trimester)   Social History:   Education level: Some College   Occupation: In school at [employer]   Partners name: Pérez   ?   OB History    Para Term  AB Living   2 0 0 0 1 0   SAB TAB Ectopic Multiple Live Births   0 1 0 0 0      # Outcome Date GA Lbr Pipo/2nd Weight Sex Delivery Anes PTL Lv   2 Current            1 TAB 2018 8w0d             History:   History reviewed. No pertinent past medical history.   History reviewed. No pertinent surgical history.   Family History   Problem Relation Age of Onset     No Medical Problems Mother      No Medical Problems Father      No Medical Problems Sister      No Medical Problems Brother      No Medical Problems Maternal Grandmother      Stroke Paternal Grandmother      Diabetes Paternal Grandmother      No Medical Problems Paternal Grandfather      No Medical Problems Brother       Social History     Tobacco Use  "    Smoking status: Former Smoker     Packs/day: 0.00     Last attempt to quit: 2019     Years since quittin.6     Smokeless tobacco: Never Used   Substance Use Topics     Alcohol use: Not Currently     Drug use: Never      Current Outpatient Medications   Medication Sig Dispense Refill     PNV,calcium 72-iron-folic acid (PRENATAL PLUS, CALCIUM CARB,) 27 mg iron- 1 mg Tab Take 1 tablet by mouth.       No current facility-administered medications for this visit.       No Known Allergies   The patient's medical, surgical and family histories were reviewed and were pertinent to this visit as noted.   No pap screening yet s/t age.       EPDS score today: 3     12 point Review of Systems   Negative except as noted.  Of note, denies s/s of pre-eclampsia      OBJECTIVE:   Objective    Vitals:    10/25/19 1224   BP: (!) 142/92   Pulse: 76   Weight: 149 lb (67.6 kg)   Height: 4' 11.75\" (1.518 m)      Physical Exam:   General Appearance: Alert, cooperative, no distress, appears stated age   First OB exam at previous clinic 2019 WNL  FHT: +   Neurologic: Alert and oriented x 3. Normal speech   Lab:   Results for orders placed or performed in visit on 10/25/19   AST (SGOT)   Result Value Ref Range    AST 18 0 - 40 U/L   ALT (SGPT)   Result Value Ref Range    ALT 11 0 - 45 U/L   INR   Result Value Ref Range    INR 0.90 0.90 - 1.10   APTT(PTT)   Result Value Ref Range    PTT 26 24 - 37 seconds   HM2(CBC w/o Differential)   Result Value Ref Range    WBC 8.8 4.0 - 11.0 thou/uL    RBC 4.49 3.80 - 5.40 mill/uL    Hemoglobin 12.9 12.0 - 16.0 g/dL    Hematocrit 38.4 35.0 - 47.0 %    MCV 85 80 - 100 fL    MCH 28.7 27.0 - 34.0 pg    MCHC 33.5 32.0 - 36.0 g/dL    RDW 14.1 11.0 - 14.5 %    Platelets 200 140 - 440 thou/uL    MPV 7.8 7.0 - 10.0 fL   Creatinine   Result Value Ref Range    Creatinine 0.68 0.60 - 1.10 mg/dL    GFR MDRD Af Amer >60 >60 mL/min/1.73m2    GFR MDRD Non Af Amer >60 >60 mL/min/1.73m2   Protein/Creatinine " Ratio, Urine Random   Result Value Ref Range     Protein, Random Urine 818 mg/dL    Creatinine, Urine 115.6 mg/dL    Protein/Creatinine Ratio, Random Urine 7.08    Uric Acid   Result Value Ref Range    Uric Acid 7.6 (H) 2.0 - 7.5 mg/dL             RITO Parekh, CNM, CLC  10/26/2019  8:34 AM

## 2021-06-02 NOTE — TELEPHONE ENCOUNTER
Katrina put in an order on the 25th for growth U/S but pt has had 1 or 2 at [facility] and admitted and discharged. They just need confirmation if she should keep this U/S as well? They feel the answer is no but would like to hear from us. Please call anyone in U/S at [phone]

## 2021-06-02 NOTE — TELEPHONE ENCOUNTER
Called patient and left a voicemail to call ON CALL CNM today due to lab results we need to speak with her about. Patient desires to go to [facility], however they are on divert. Would like to invite her to come to [facility] for growth ultrasound, I ll be consult, BPP an NST and also to discuss plan of care for remainder of pregnancy. Left phone number for ON CALL CNM on her voicemail.

## 2021-06-02 NOTE — TELEPHONE ENCOUNTER
Patient notified that her U/S has been canceled and she will come in and have visit with Lexa Curiel only, no U/S.

## 2021-06-03 VITALS
BODY MASS INDEX: 29.25 KG/M2 | WEIGHT: 149 LBS | DIASTOLIC BLOOD PRESSURE: 92 MMHG | SYSTOLIC BLOOD PRESSURE: 142 MMHG | HEIGHT: 60 IN | HEART RATE: 76 BPM

## 2021-06-03 VITALS
DIASTOLIC BLOOD PRESSURE: 78 MMHG | WEIGHT: 129.6 LBS | BODY MASS INDEX: 25.45 KG/M2 | HEART RATE: 88 BPM | HEIGHT: 60 IN | SYSTOLIC BLOOD PRESSURE: 118 MMHG

## 2021-06-03 VITALS
DIASTOLIC BLOOD PRESSURE: 89 MMHG | SYSTOLIC BLOOD PRESSURE: 146 MMHG | WEIGHT: 152 LBS | BODY MASS INDEX: 29.93 KG/M2 | HEART RATE: 81 BPM | OXYGEN SATURATION: 98 %

## 2021-06-03 VITALS — WEIGHT: 149 LBS | BODY MASS INDEX: 29.25 KG/M2 | HEIGHT: 60 IN

## 2021-06-03 NOTE — TELEPHONE ENCOUNTER
TC:     Telephone call: Florentino Brantley delivered a stillborn baby on 10/31/2019 by spontaneous vaginal delivery.  Did not answer phone.  Left voice mail stating sadness over her baby's death, and offering to follow up with our midwives anytime in the coming 2-6 weeks to talk about her experience or have a routine 6 week postpartum visit.  Given number for scheduling if desired.  Also reassured okay to follow up with MDs if that is what she feels is best.  Let her know we are thinking about her, and hope she is coping as well as can be expected.      RITO Florentino,SIVAN   11/11/2019 4:45 PM

## 2021-06-03 NOTE — TELEPHONE ENCOUNTER
Hello,    Recently this patient was ordered to have a post- home care visit following discharge.     This morning, I spoke to patient who and confirmed her to be seen by one of our nurses tomorrow, .     When the nurse gave her a call this afternoon to schedule a specific time, patient declined visit stating that tomorrow is her baby's  and she would like to cancel.     We had tried multiples times prior to today to reach the patient, but were unsuccessful. Due to this, our only option to have this visit would have been tomorrow to keep it within insurance restrictions.     Due to her cancellation, this patient will not be able to be seen for home care for a post-partum assessment.     If you have any questions or concerns, please give [facility] a call at [phone].    Thank you,   Pratibha De Jesus  [facility] Intake

## 2021-06-04 NOTE — PROGRESS NOTES
TONO@Patient ID: Florentino Brantley is a 19 y.o. female.  Assessment:   19 y.o  at 6 weeks postpartum, vaginal delivery of IUFD at 35w 3d  Contraception:condoms  Vaginal discharge  Generalized skin rash of arms and lower legs  EPDS:7, 0 on #10        Plan:    1. Wet prep  2. Desired contraception: condoms.   3. Discussed resumption of exercise and setting a goal to return to pre-pregnancy weight in the next 6-12 months.   4.  Resumption of intercourse reviewed with possible changes in libido and vaginal lubrication while nursing. Also counseled regarding healthy pregnancy spacing and advised waiting to become pregnant 1 year following her last birth. Patient aware she can become pregnant at this time.   4.  Nutrition and supplements reviewed.  Advised continuation of a prenatal or multivitamin, also Vitamin D3 8136-2119 IU geltab daily and an omega 3 fatty acid supplement.  5.Warning signs and symptoms related to postpartum mood disorders reviewed.   6.Hydrocortisone cream 0.5%, apply two times a day to affected areas on arms and legs x 7 days  6.Return to clinic in  for annual well woman exam and initial pap screen or sooner as needed.    Total time spent with patient 40 minutes, >50% counseling, education and coordination of care.     RITO Herrera CNM     19 1:30 PM    Subjective:     Florentino Brantley is a 19 y.o. female who presents for postpartum visit. She is 6 weeks postpartum following a vaginal delivery of an IUFD at 35w 3d.    I have fully reviewed the prenatal and intrapartum course. Her birth was . Her pregnancy was complicated by assymetric  IUGR, pre-eclampsia, and a placental abruption.    Her son is named Bebeto and weighed 3 lbs 11 oz at birth.    She reports she is doing well considering her recent experience.  Her postpartum course has been stable.She has a lot of support from her partner and from family members. Discussed counseling and infant loss  support groups She declines at this time.       [place] postpartum depression screening score: 7, 0 on #10    Patient denies a history of depression and/or postpartum depression.  Discussed signs and sx of PPD and that this may occur at anytime up to 1 year postpartum.  I encouraged the patient to call the office for an appointment if she feels she is experiencing mood changes concerning for PPD.  Discussed that if her sx are non-urgent to call the office and that if her symptoms are urgent(i.e.she has thoughts of harming herself or others) to call the CNM on call for immediate help.    Florentino reports a skin rash that started approximatley 2 weeks ago.  The rash is diffuse, erythematous, and slightly raised on lower legs bilaterally and lower arms bilaterally.  She denies itching or open areas.  She denies new soaps/detergent, allergies, and/or sick contacts.      Lochia ceased at 3 weeks postpartum.      Bowel function is normal. Bladder function is normal.     She has not resumed intercourse.     Desired contraception: condoms. Reviewed return to fertility ad healthy pregnancy spacing.     She has not resumed regular exercise. Discussed the current recommendation of exercising for 30 minutes daily on most days to an intensity that raises the heart rate.  Discussed eating healthy foods and recommended 2 L of water daily.      Review of Systems  General:  Denies problem  Eyes: Denies problem  Ears/Nose/Throat: Denies problem  Cardiovascular: Denies problem  Respiratory:  Denies problem  Gastrointestinal:  Denies problem  Genitourinary: Denies problem  Musculoskeletal:  Denies problem  Skin: Rash of lower legs and arms.  Neurologic: Denies problem  Psychiatric: Denies Problem  Endocrine: Denies problem    Objective:         Physical Exam:  General Appearance: Alert, cooperative, no distress, appears stated age  Skin: Skin turgor normal. Diffuse, erythematous, slightly raised rash on lower legs bilaterally and lower  arms bilaterally.  Throat: Lips, mucosa, and tongue normal; teeth and gums normal  HEENT: grossly normal; otoscopic and opthalmic exam not performed.   Neck: Supple, symmetrical, trachea midline, no adenopathy;  thyroid: not enlarged, symmetric, no tenderness/mass/nodules  Lungs: Clear to auscultation bilaterally, respirations unlabored  Breasts:Deferred  Heart: Regular rate and rhyt m, S1 and S2 normal, no murmur, rub, or gallop  Abdomen: Soft, non-tender. Rectus diastasis  Pelvic:External genitalia normal without lesions or irritation. Laceration  well healed.  Extremities: Extremities normal without varicosities or edema       Last Pap: N/A    Immunization History   Administered Date(s) Administered     DTaP / HiB 06/25/2004     Dtap 05/18/2001     HIB (HBOC) 03/27/2000     HPV Quadrivalent 08/30/2012, 02/11/2015     Hep B, Peds or Adolescent 11/03/2000, 09/05/2019     Hepatitis A, Ped/Adol 2 Dose IM (18yr & under) 08/10/2016     IPV 06/25/2004     Influenza, Seasonal, Inj PF IIV3 11/21/2011, 12/03/2012     Influenza,seasonal quad, PF, =/> 6months 11/20/2014, 09/05/2019     Influenza,seasonal, Inj IIV3 10/12/2009, 10/26/2010     MMR 11/03/2000, 06/25/2004     Meningococcal MCV4O 08/30/2012     Pneumo Conj 7-V(before 2010) 11/03/2000, 05/18/2001     Tdap 08/30/2012, 10/03/2019     Typhoid, Inj, Inactive 08/10/2016     Varicella 11/03/2000, 08/30/2012     Immunization status: up to date and documented

## 2021-06-07 NOTE — TELEPHONE ENCOUNTER
New Appointment Needed  What is the reason for the visit:    Mantoux Placement  Appt Request  What is the purpose of the mantoux?:  Work: [facility] [employer]  Is there a form to be completed?:   Yes  How soon do you need the mantoux placed?:  date: Before April 6, 2020    Provider Preference: Any available  How soon do you need to be seen?: AS soon as Possible  Waitlist offered?: No  Okay to leave a detailed message:  Yes, Patient also needs Hep B vaccine as well

## 2021-06-07 NOTE — TELEPHONE ENCOUNTER
Lexa Hopes no longer works in our department regularly. I put in an order for the vaccination and the Mantoux. Can you please call the patient to set up a lab appointment? Thanks, Shelbi Robles

## 2021-06-16 PROBLEM — Z87.59 HISTORY OF PLACENTAL ABRUPTION: Status: ACTIVE | Noted: 2019-12-22

## 2021-06-16 PROBLEM — Z23 NEED FOR HEPATITIS B VACCINATION: Status: ACTIVE | Noted: 2019-07-25

## 2021-06-16 PROBLEM — R21 SKIN RASH: Status: ACTIVE | Noted: 2019-12-22

## 2021-06-16 PROBLEM — B96.89 BV (BACTERIAL VAGINOSIS): Status: ACTIVE | Noted: 2019-12-19

## 2021-06-16 PROBLEM — Z87.891 FORMER LIGHT TOBACCO SMOKER: Status: ACTIVE | Noted: 2019-07-25

## 2021-06-16 PROBLEM — Z87.59 HISTORY OF IUFD: Status: ACTIVE | Noted: 2019-12-22

## 2021-06-16 PROBLEM — B37.31 VAGINAL YEAST INFECTION: Status: ACTIVE | Noted: 2019-12-19

## 2021-06-16 PROBLEM — Z87.59 HISTORY OF PRIOR PREGNANCY WITH IUGR NEWBORN: Status: ACTIVE | Noted: 2019-12-22

## 2021-06-16 PROBLEM — Z87.59 HISTORY OF SEVERE PRE-ECLAMPSIA: Status: ACTIVE | Noted: 2019-12-22

## 2021-06-16 PROBLEM — N76.0 BV (BACTERIAL VAGINOSIS): Status: ACTIVE | Noted: 2019-12-19

## 2021-06-16 NOTE — TELEPHONE ENCOUNTER
Telephone Encounter by Mary Cain CMA at 10/30/2019 12:10 PM     Author: Mary Cain CMA Service: -- Author Type: Certified Medical Assistant    Filed: 10/30/2019 12:11 PM Encounter Date: 10/30/2019 Status: Signed    : Mayr Cain CMA (Certified Medical Assistant)       Faby Randle, RITO,SIVAN  You 21 minutes ago (11:45 AM)      I believe she is just following with NST's and BPP's and will see  tomorrow for clinic visit.   Please call [facility] and cancel   Thanks   Faby sebastian    Routing comment

## 2021-06-27 ENCOUNTER — HEALTH MAINTENANCE LETTER (OUTPATIENT)
Age: 21
End: 2021-06-27

## 2021-07-03 NOTE — ADDENDUM NOTE
Addendum Note by Lexa Curiel APRN, CNM at 12/19/2019  1:00 PM     Author: Lexa Curiel APRN, CNM Service: -- Author Type: Midwife    Filed: 12/22/2019  3:58 PM Encounter Date: 12/19/2019 Status: Signed    : Lexa Curiel APRN, CNM (Midwife)    Addended by: LEXA CURIEL on: 12/22/2019 03:58 PM        Modules accepted: Level of Service

## 2021-07-14 PROBLEM — Z34.90 PREGNANT: Status: RESOLVED | Noted: 2019-10-30 | Resolved: 2019-12-19

## 2021-07-14 PROBLEM — O09.31: Status: RESOLVED | Noted: 2019-10-25 | Resolved: 2019-12-22

## 2021-07-14 PROBLEM — O36.4XX0 IUFD AT 20 WEEKS OR MORE OF GESTATION: Status: RESOLVED | Noted: 2019-10-30 | Resolved: 2019-12-22

## 2021-07-14 PROBLEM — O14.93 PRE-ECLAMPSIA IN THIRD TRIMESTER: Status: RESOLVED | Noted: 2019-10-26 | Resolved: 2019-12-22

## 2021-07-14 PROBLEM — B96.89 BACTERIAL VAGINOSIS IN PREGNANCY: Status: RESOLVED | Noted: 2019-10-26 | Resolved: 2019-12-19

## 2021-07-14 PROBLEM — Z34.02 ENCOUNTER FOR SUPERVISION OF NORMAL FIRST PREGNANCY IN SECOND TRIMESTER: Status: RESOLVED | Noted: 2019-07-25 | Resolved: 2019-12-22

## 2021-07-14 PROBLEM — O13.3 GESTATIONAL HYPERTENSION, THIRD TRIMESTER: Status: RESOLVED | Noted: 2019-10-25 | Resolved: 2019-12-22

## 2021-07-14 PROBLEM — O36.4XX0 FETAL DEMISE IN SINGLETON PREGNANCY GREATER THAN 22 WEEKS GESTATION, ANTEPARTUM: Status: RESOLVED | Noted: 2019-10-30 | Resolved: 2019-12-22

## 2021-07-14 PROBLEM — O36.5930 POOR FETAL GROWTH AFFECTING MANAGEMENT OF MOTHER IN THIRD TRIMESTER: Status: RESOLVED | Noted: 2019-10-26 | Resolved: 2019-12-22

## 2021-07-14 PROBLEM — O23.599 BACTERIAL VAGINOSIS IN PREGNANCY: Status: RESOLVED | Noted: 2019-10-26 | Resolved: 2019-12-19

## 2021-10-17 ENCOUNTER — HEALTH MAINTENANCE LETTER (OUTPATIENT)
Age: 21
End: 2021-10-17

## 2022-07-24 ENCOUNTER — HEALTH MAINTENANCE LETTER (OUTPATIENT)
Age: 22
End: 2022-07-24

## 2022-10-02 ENCOUNTER — HEALTH MAINTENANCE LETTER (OUTPATIENT)
Age: 22
End: 2022-10-02

## 2023-08-12 ENCOUNTER — HEALTH MAINTENANCE LETTER (OUTPATIENT)
Age: 23
End: 2023-08-12